# Patient Record
Sex: MALE | Race: WHITE | Employment: OTHER | URBAN - METROPOLITAN AREA
[De-identification: names, ages, dates, MRNs, and addresses within clinical notes are randomized per-mention and may not be internally consistent; named-entity substitution may affect disease eponyms.]

---

## 2019-12-17 ENCOUNTER — TELEPHONE (OUTPATIENT)
Dept: NEUROLOGY | Age: 74
End: 2019-12-17

## 2019-12-17 NOTE — TELEPHONE ENCOUNTER
* Message from [facility] below:      ----- Message from Daryl Meadows sent at 12/13/2019  2:30 PM EST -----  Regarding: Ta Maurer - /Telephone  General Message/Vendor Calls    Caller's first and last name:  Stewart Gregorio     Reason for call: Pt case Coleen Youngdemar called on behalf of the pt to schedule a sooner New Pt appt to diagnose and treat worsening symptoms of \"CPE\". No sooner appt's were avaliable however, Ms. Gladys Acharya was advised to seek appropriate medical care.        Callback required yes/no and why: Yes       Best contact number(s):[phone]       Details to clarify the request: N/A

## 2020-02-17 ENCOUNTER — OFFICE VISIT (OUTPATIENT)
Dept: NEUROLOGY | Age: 75
End: 2020-02-17

## 2020-02-17 VITALS
SYSTOLIC BLOOD PRESSURE: 138 MMHG | WEIGHT: 189 LBS | BODY MASS INDEX: 23.5 KG/M2 | HEIGHT: 75 IN | HEART RATE: 71 BPM | DIASTOLIC BLOOD PRESSURE: 74 MMHG | OXYGEN SATURATION: 98 %

## 2020-02-17 DIAGNOSIS — G31.84 MCI (MILD COGNITIVE IMPAIRMENT): Primary | ICD-10-CM

## 2020-02-17 RX ORDER — DESVENLAFAXINE 100 MG/1
TABLET, EXTENDED RELEASE ORAL DAILY
COMMUNITY

## 2020-02-17 NOTE — PROGRESS NOTES
Neurology Note    Patient ID:  Juan Stinson  <[id number]>  78 y.o.  1945      Date of Consultation:  February 17, 2020    Referring Physician: Dr. Laina Chacko  Reason for Consultation:  Cognitive concerns    Subjective: do I have CTE? History of Present Illness:   Juan Santiago is a 69 y.o. male was referred to the neurology clinic at [facility] for an evaluation. The patient is concerned about his memory. He feels that he is more forgetful. He states he has immediate recall but he has difficulty with short term memory. He is also having difficulties with his honeydo list.  He is also more forgetful when he is multitasking and has trouble concentrating. He reports that he is always a slight bit absent-minded but it is more noticeable. He does present today with his wife who provides additional information. He feel that most of his symptoms began approximately 2 years ago. He was competing in an athletic event at that time. It was immediately afterwards that he just seemed to have a different personality. He was actually admitted to [facility] for an anxiety attack. There was multiple medications that were started for anxiety and depression and ultimately Pristiq seems to be helping with that. He has become more and more concerned about his memory and his forgetfulness. He does question how much this may be related to his sleep. He does wake up anywhere between 3-5 times at night due to having to urinate and he does wonder if this sleep deprivation is at all contributing to his cognitive limitations. He still does work full-time and has had no concerns in regards to his work performance. 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 He does state that he knows that he has had a concussion.   He is unsure if he is ever lost consciousness but he does feel that he has had his bell rung multiple times but never to the point where he needed to come out of the game. He has went and establish care with a primary care doctor. Dr. Chalo Cornejo [facility]   He states he did have some serology performed and initially was told that he was slightly anemic. They do know that his thyroid and his Lyme testing. He is unsure what other lab tests were ordered. We do not have a copy of those labs available for my review today. Past Medical History:   Diagnosis Date    Anxiety     Cerebral artery occlusion with cerebral infarction [facility])         Past Surgical History:   Procedure Laterality Date    HX CERVICAL FUSION          History reviewed. No pertinent family history. Social History     Tobacco Use    Smoking status: Never Smoker    Smokeless tobacco: Never Used   Substance Use Topics    Alcohol use: Yes     Alcohol/week: 1.0 standard drinks     Types: 1 Glasses of wine per week        No Known Allergies     Prior to Admission medications    Medication Sig Start Date End Date Taking? Authorizing Provider   Desvenlafaxine (PRISTIQ) 100 mg Tb24 Take  by mouth.    Yes Provider, Historical       Review of Systems:    General, constitutional: negative  Eyes, vision: negative  Ears, nose, throat: negative  Cardiovascular, heart: negative  Respiratory: negative  Gastrointestinal: negative  Genitourinary: negative  Musculoskeletal: negative  Skin and integumentary: negative  Psychiatric: negative  Endocrine: negative  Neurological: negative, except for HPI  Hematologic/lymphatic: negative  Allergy/immunology: negative      Objective:     Visit Vitals  /74   Pulse 71   Ht 6' 3\" (1.905 m)   Wt 189 lb (85.7 kg)   SpO2 98%   BMI 23.62 kg/m²       Physical Exam:      General:  appears well nourished in no acute distress  Neck: no carotid bruits  Lungs: clear to auscultation  Heart:  no murmurs, regular rate  Lower extremity: peripheral pulses palpable and no edema  Skin: intact    Neurological exam:    Awake, alert, oriented to person, place and time  Recent and remote memory were normal  Attention and concentration were intact  Language was intact. There was no aphasia  Speech: no dysarthria  Fund of knowledge was preserved  He did name 17 words that start with the P in 1 minute today. I cannot appreciate a cognitive limitation but he has a very high IQ and does quite well on bedside testing. Cranial nerves:   II-XII were tested    Perrrla  Fundoscopic examination revealed venous pulsations and no clear abnormalities  Visual fields were full  Eomi, no evidence of nystagmus  Facial sensation:  normal and symmetric  Facial motor: normal and symmetric  Hearing intact  SCM strength intact  Tongue: midline without fasciculations    Motor: Tone normal    No evidence of fasciculations    Strength testing:   deltoid triceps biceps Wrist ext. Wrist flex. intrinsics Hip flex. Hip ext. Knee ext. Knee flex Dorsi flex Plantar flex   Right 5 5 5 5 5 5 5 5 5 5 5 5   Left 5 5 5 5 5 5 5 5 5 5 5 5         Sensory:  Upper extremity: intact to pp, light touch, and vibration > 10 seconds  Lower extremity: intact to pp, light touch. Vibration was 5 seconds at his toes and 8 seconds at his ankles. Reflexes:    Right Left  Biceps  2 2  Triceps 2 2  Brachiorad. 2 2  Patella  2 2  Achilles 1 1    Plantar response:  flexor bilaterally    Incidental finding of a palmamental on the left. Cerebellar testing:  no tremor apparent, finger/nose and di were intact    Romberg: absent    Gait: steady. tandem gait were normal    I do not have any imaging or labs for my review. Assessment and Plan:   Patient is a pleasant 82-year-old gentleman who presents with concerns of memory loss. His neurological examination was intact except for an incidental finding of a palmomental reflex.     Cognitive impairment:    The differential for this does include early onset dementia versus chronic traumatic encephalopathy versus space-occupying lesion versus metabolic disturbance including sleep deprivation versus a pseudodementia associated with his anxiety and depression. I do think he would benefit from an MRI of his brain with and without contrast.    I would also recommend that he receive neuropsychological testing. I did place a referral with Dr. Sanford Beauchamp. I will have him sign a release of information so I can see what metabolic testing has been completed and if additional labs are needed we will surely get those sent off. I would recommend that he work closely with his primary care doctor in regards to his frequent urination at night to see if we can improve his sleep hygiene. The patient should return to clinic after above testing    Renewed medication:none     I spent considerable time today discussing with the patient and his wife the differential diagnosis. There may be additional testing that we need to perform after this first testing. Depending what we do find, we will develop an appropriate treatment plan. All of their questions were answered fully today.     Signed By:  Taniya Bee DO FAAN    February 17, 2020

## 2020-02-18 ENCOUNTER — DOCUMENTATION ONLY (OUTPATIENT)
Dept: NEUROLOGY | Age: 75
End: 2020-02-18

## 2020-03-09 ENCOUNTER — HOSPITAL ENCOUNTER (OUTPATIENT)
Dept: MRI IMAGING | Age: 75
Discharge: HOME | End: 2020-03-09
Attending: PSYCHIATRY & NEUROLOGY
Payer: MEDICARE

## 2020-03-09 DIAGNOSIS — G31.84 MCI (MILD COGNITIVE IMPAIRMENT): ICD-10-CM

## 2020-03-09 PROCEDURE — A9575 INJ GADOTERATE MEGLUMI 0.1ML: HCPCS | Performed by: PSYCHIATRY & NEUROLOGY

## 2020-03-09 PROCEDURE — 74011250636 HC RX REV CODE- 250/636: Performed by: PSYCHIATRY & NEUROLOGY

## 2020-03-09 PROCEDURE — 70553 MRI BRAIN STEM W/O & W/DYE: CPT

## 2020-03-09 RX ORDER — GADOTERATE MEGLUMINE 376.9 MG/ML
17 INJECTION INTRAVENOUS
Status: COMPLETED | OUTPATIENT
Start: 2020-03-09 | End: 2020-03-09

## 2020-03-09 RX ADMIN — GADOTERATE MEGLUMINE 17 ML: 376.9 INJECTION INTRAVENOUS at 21:05

## 2020-03-11 NOTE — PROGRESS NOTES
Hi,    I did review your MRI. There was a mild amount of atrophy. Interestingly, he did have 3 silent strokes in the past.  After you complete your cognitive testing with Dr. Sandra Arias, I will have you come back to clinic so we can fully discuss the MRI and the results of the additional testing.     There is nothing acutely that we need to do    Sincerely, Dr. Severo Callas

## 2020-03-17 ENCOUNTER — OFFICE VISIT (OUTPATIENT)
Dept: NEUROLOGY | Age: 75
End: 2020-03-17

## 2020-03-17 DIAGNOSIS — G31.84 MILD COGNITIVE IMPAIRMENT WITH MEMORY LOSS: Primary | ICD-10-CM

## 2020-03-17 DIAGNOSIS — R41.89 COGNITIVE DECLINE: Primary | ICD-10-CM

## 2020-03-17 NOTE — PROGRESS NOTES
This note will not be viewable in [address] [facility] at [address]    Office:  [phone]  Fax: [phone]                 Initial Office Exam    Patient Name: Shahid Hull. Age: 69 y.o. Gender: male   Occupation: Dentist  Handedness: left handed   Presenting Concern: Short-term memory  Primary Care Physician: Latrice Barton MD  Referring Provider: Genesis Lovelace MD    REASON FOR REFERRAL:  This comprehensive and medically necessary neuropsychological assessment was requested to assist with a differential diagnosis of MCI. The use and purpose of this examination, as well as the extent and limitations of confidentiality, were explained prior to obtaining permission to participate. Instructions were provided regarding the necessity to put forth optimal effort and answer questions truthfully in order to obtain reliable and accurate test results. PERTINENT HISTORY:  Mr. Mekhi Lopez presented for a neuropsychological assessment at the recommendation of his treating physician secondary to complaints of memory problems, impaired concentration, less verbal, feeling slowed down, more moodiness, and more impulsive. Mr. Mekhi Lopez began noticing symptoms about 2-3 years ago. He also reports a progressive worsening of anxiety. Reports participating in football and rugby in his youth, but stated that he only sustained minor concussions. From a brief review of his medical and personal history there has not been any other significant neurological injury or illness noted or reported. He did not report experiencing depression or anxiety in the past.      Mr. Mekhi Lopez does not  report any problems at birth or difficulties meeting developmental milestones.  He reports that he had an adequate level of family support and was not subject to trauma or abuse as a child. Mr. Yang Contreras does not  report being retain in school or receiving special assistance in any of he classes or subjects. Mr. Yang Contreras completed 20 years of education. Mr. Yang Contreras does  exercise on a regular basis and does  maintain a balanced diet. He does  report problems with sleep and does  complain of pain. He does  participate in mentally stimulating activities. Mr. Yang Contreras does not  have concerns regarding prescription medications, family members, place of residence, or financial stressors. Mr. Yang Contreras indicated that he is independent in his instrumental activities of daily living, including shopping, meal preparation, housekeeping, doing laundry, driving a car, managing medications, and finances. Current Outpatient Medications   Medication Sig    Desvenlafaxine (PRISTIQ) 100 mg Tb24 Take  by mouth. No current facility-administered medications for this visit. Past Medical History:   Diagnosis Date    Anxiety     Cerebral artery occlusion with cerebral infarction ([facility] [address]        No flowsheet data found. No data recorded    Past Surgical History:   Procedure Laterality Date    HX CERVICAL FUSION         Social History     Socioeconomic History    Marital status:      Spouse name: Not on file    Number of children: Not on file    Years of education: Not on file    Highest education level: Not on file   Tobacco Use    Smoking status: Never Smoker    Smokeless tobacco: Never Used   Substance and Sexual Activity    Alcohol use: Yes     Alcohol/week: 1.0 standard drinks     Types: 1 Glasses of wine per week       No family history on file. CT Results (most recent):  No results found for this or any previous visit.   MRI Results (most recent):  Results from [place] encounter on 03/09/20   MRI BRAIN W WO CONT    Narrative EXAM:  MRI BRAIN W WO CONT    INDICATION:    cte vs dementia    COMPARISON: None.    CONTRAST: 17 ml Dotarem. TECHNIQUE:    Multiplanar multisequence acquisition without and with contrast of the brain. FINDINGS:  Mild generalized parenchymal volume loss with commensurate dilation of the sulci  and ventricular system. Small chronic infarcts are noted in the right corona  radiata/basal ganglia, right medial thalamus, and left cerebellum. No chronic  microhemorrhages are identified. There is no acute infarct, hemorrhage,  extra-axial fluid collection, or mass effect. There is no cerebellar tonsillar  herniation. Expected arterial flow-voids are present. No evidence of abnormal  enhancement. Retention cyst in the left maxillary sinus, and scattered mucosal thickening in  the bilateral ethmoidal air cells without air-fluid level. The mastoid air cells  and middle ears are clear. The orbital contents are within normal limits. No  significant osseous or scalp lesions are identified. Impression IMPRESSION:     1. No acute intracranial abnormality. 2. Mild generalized parenchymal volume loss. Small chronic infarcts in the right  corona radiata/basal ganglia, right thalamus, and left cerebellum. MENTAL STATUS:    Orientation: Fully oriented   Eye Contact: appropriate   Motor Behavior:  Ambulates independently   Speech:  Fluent and intelligible   Thought Process:  Goal-directed   Thought Content: No evidence of hallucinations or delusions   Suicidal ideations: denies   Mood:  euthymic   Affect:  Congruent with stated mood   Concentration:  WNL   Abstraction:  Mildly reduced   Insight:  WNL     On the Modified Mini-Mental Status Exam: 92/100 (WNL)      DIAGNOSTIC IMPRESSIONS:    ICD-10-CM ICD-9-CM    1. Cognitive decline R41.89 294.9              PLAN:  1. Complete a comprehensive neuropsychological assessment to provide a differential diagnosis of presenting concerns as well as to assist with disposition and treatment planning as appropriate.   2. Consider compensatory and remedial cognitive training. 81012 x 1 Review of records. Face to face interview w/ patient. Determine test protocol: 60 minutes. Total 1 unit  56401 continuation of service      Kortney Leija, PhD, ABPP, LCP  Licensed Clinical Psychologist/ Neuropsychologist        This note will not be viewable in [address]

## 2020-03-17 NOTE — LETTER
3/26/20 Patient: Rachel Morrow. YOB: 1945 Date of Visit: 3/17/2020 Kaitlyn Chapman MD 
[address] [facility] [address] VIA Facsimile: [phone] Annabel Hewitt DO 
[address] [place] [address] Drive [address] [address] VIA In Basket Dear MD Annabel Roth DO, Thank you for referring Mr. Natalie Le to [address]  for evaluation. My notes for this consultation are attached. This note will not be viewable in [address] [facility] at [address] Medical Office [address] Office:  [phone]  Fax: [phone] Neuropsychological Evaluation Report Patient Name: Rachel Morrow. Age: 69 y.o. Gender: male Occupation: Dentist 
Handedness:  Left-handed Presenting Concern: Short-term memory Primary Care Physician: Bonnie Barry MD 
Referring Provider: Holly Navarrete MD 
  
 
PATIENT HISTORY (OBTAINED DURING INITIAL CLINICAL EVALUATION): 
 
REASON FOR REFERRAL: 
This comprehensive and medically necessary neuropsychological assessment was requested to assist with a differential diagnosis of MCI. The use and purpose of this examination, as well as the extent and limitations of confidentiality, were explained prior to obtaining permission to participate. Instructions were provided regarding the necessity to put forth optimal effort and answer questions truthfully in order to obtain reliable and accurate test results. 
  
PERTINENT HISTORY: 
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    Dr. Robert Bejarano began noticing symptoms about 2-3 years ago. He also reports a progressive worsening of anxiety. Reports participating in football and rugby in his youth, but stated that he only sustained minor concussions. Recent imaging of the brain indicates mild generalized parenchymal volume loss with commensurate dilation of the sulci and ventricular system. 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 From a brief review of his medical and personal history there has not been any other significant neurological injury or illness noted or reported. He did  report experiencing depression and anxiety in the past 2-3 years.   
  
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 He reports that he had an adequate level of family support and was not subject to trauma or abuse as a child. Dr. Shantel Spain does not  report being retain in school or receiving special assistance in any of he classes or subjects. Dr. Shantel Spain completed 20 years of education. 
  
Dr. Shantel Spain does  exercise on a regular basis and does  maintain a balanced diet. He does  report problems with sleep and does  complain of pain. He does  participate in mentally stimulating activities. Dr. Shantel Spain does not  have concerns regarding prescription medications, family members, place of residence, or financial stressors. Dr. Shantel Spain indicated that he is independent in his instrumental activities of daily living, including shopping, meal preparation, housekeeping, doing laundry, driving a car, managing medications, and finances. 
  
 
METHODS OF ASSESSMENT (Current Evaluation): 
Clinician Administered: 
Clinical Interview Review of Medical Records Clock Drawing Task Modified Mini-Mental Status Exam (3MS) Test of Premorbid Functioning Incidental Memory Test 
Revised Memory and Behavior Checklist 
 
Technician Administered: DKEFS: Design Fluency Test, [place] Test 
Neuropsychological Assessment Battery Test of Memory Malingering Test of Practical Judgment (9-Item) [place] Making Test 
[employer] Test 
Word Choice Effort Test (ACS) TEST OBSERVATIONS: 
Dr. Annmarie Cuba arrived promptly for the testing session. Dress and grooming were appropriate; physical presentation was unchanged from that observed during the clinical interview. Speech was fluent, intelligible, and goal-directed. Affect was congruent with the euthymic mood conveyed. Dr. Annmarie Cuba was adequately cooperative and appeared to put forth good effort throughout this examination. Rapport with the examiner was adequately established and maintained. Minimal prompting was required. Comprehension of test instructions was not problematic. Performance motivation was objectively measured by three instruments (Reliable Digits, TOMM, Word Choice Effort), and Dr. Annmarie Cuba produced a normal score on these measures. Accordingly, test findings below do not appear to be the product of disingenuous effort. Given the above observations, plus comments contained in the Mental Status section, the results of this examination are regarded as reasonably reliable and valid. TEST RESULTS: 
Quantitative test results are derived from comparisons to age and education corrected cohort normative data, where applicable. Percentiles are included in these instances. Qualitative test results are determined using clinical observations. General Orientation and Awareness:      
Orientation to person, year, month, day of month, day of week, state, town, and circumstance. Awareness of deficits: Aware Cognitive performance validity testing: Valid Attention/Concentration:      Classification:    
Simple visuomotor tracking (18 percentile)               Low Average Digits forward (90 percentile)                 High Average Digits backward (50 percentile)                 Average Visual scanning (58 percentile)                            Average Simple information processing  efficiency (79 percentile)  High Average Complex information processing efficiency (4 percentile)  Moderately Impaired Attention to visual detail of driving scenes (8 percentile)                         Mildly Impaired Visuospatial and Constructional Praxis:    
Visual discrimination (50 percentile)                            Average Design construction (62 percentile)                 Average Figure drawing copy (93 percentile)                                                  Superior Language:        
Expressive speech in oral production (62 percentile)       Average Auditory comprehension (<1 percentile)                   Severely Impaired Naming (73 percentile)          Average Reading comprehension (50 percentile)        Average Writing (24 percentile)                     Low Average Bill payment task (66 percentile)                    Average Memory and Learning:      
Word list immediate recall (12 percentile)                Low Average Word list short delayed recall (<1 percentile)                Severely Impaired Word list long delayed recall (<1 percentile)                           Severely Impaired Shape learning immediate recognition (69 percentile)    Average Shape learning delayed recognition (34 percentile)               Average Story learning immediate recall (3 percentile)                Moderately Impaired Story learning delayed recall (5 percentile)                           Mildly Impaired Daily living memory immediate recall (8 percentile)        Mildly Impaired Daily living memory delayed recall (14 percentile)               Low Average Cognitive Tests of Executive Functioning:    
Ability to think flexibly, Trail Making B (6 percentile)               Mildly Impaired Design fluency total correct (50 percentile)     Average [place]( 26 percentile)          Average [place]es (58 percentile)                   Average Simple judgment in daily decision making (16 percentile)             Low Average Complex judgment in daily decision making (66 percentile)              Average Categories (7 percentile)                             Mildly Impaired Word generation (62 percentile)                  Average WCST Total Errors (18 percentile)                 Low Average Perseverative Responses (39 percentile)    Average Categories Completed (>16 percentile) Intellectual and Basic Cognitive Functioning (WAIS-IV): 
ACS Test of pre-morbid functioning reading recognition lower limit estimated WAIS-IV FSIQ score: 
     Estimated full scale IQ:           119    89 percentile     High Average IMPRESSIONS: 
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 Overall his functioning across the 5 neurocognitive domains fell in the low average range. Performance on individual domains ranged from severely impaired to average. On measures of attention and concentration his performance was in the average range, with his complex information processing efficiency being moderately impaired range and his attention to visual detail being mildly impaired. His performance on other measures within this domain ranged from low average to high average. Mr. Tania Rivera performance on measures within the spatial domain was within the average range, with no indication of impairment. Similarly, his performance on measures of the Language domain were also in the average to low average range, with the exception of the auditory comprehension which was severely impaired.  This finding is likely secondary to auditory inattentiveness, rather than comprehension, although this was not seen on measures of attention above. Dr. Christoph Tomas performance on measures within the domains of memory and executive functioning were the most impaired and in the moderate and severe range; respectively. Within the memory domain, verbal memory was severely impaired and visual memory was within the average range. These findings are not consistent with MRI studies as reported in the medical record, which indicate greater right hemispheric dysfunction. On measures of executive functioning, his overall level of functioning was in the low average range, areas of difficulty were noted on measures of cognitive flexibility and categorical reasoning. 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 There is nothing uniquely differentiating his clinical profile that is symptomatic of CTE. DIAGNOSTIC IMPRESSIONS: 
  ICD-10-CM ICD-9-CM 1. Mild cognitive impairment with memory loss G31.84 331.83 [place] NEUROPSYCHOLOGICAL TST EVAL PHYS/QHP 1ST HOUR  
  780.93 [place] NEUROPSYCHOLOGICAL TST EVAL PHYS/QHP EA ADDL HR  
   [place] PSYL/NRPSYCL TST PHYS/QHP 2+ TST 1ST 30 MIN  
   [place] PSYCL/NRPSYCL TST PHYS/QHP 2+ TST EA ADDL 30 MIN  
   [place] PSYCL/NRPSYCL TST TECH 2+ TST 1ST 30 MIN  
   [place] PSYCL/NRPSYCL TST TECH 2+ TST EA ADDL 30 MIN  
 
 
 
RECOMMENDATIONS:  
1. Findings should be reviewed with Dr. Waylon Knig to insure his understanding and discuss the potential implications. 2. Emphasis should be placed on Dr. Waylon King obtaining good sleep hygiene and maintaining adequate physical exercise to promote good brain health. 3. Mr. Jovani Carson may benefit from a referral to psychotherapy to address anxiety and work on adequate stress management skills. 4. Mr. Jovani Carson may benefit from a referral to Speech and Language Pathology for cognitive rehabilitation. 5. Mr. Jovani Carson should consider a driving evaluation to insure safe driving ability. This can be obtained through [address] [place] [address] or an assortment of other driving programs ([email or web address] ). Thank you for allowing me the opportunity to assist you in Dr. Jared sargent. Please do not hesitate to contact me should you have additional questions that I may not have addressed. 96136 x 1 
96138 x 1 
96139 x 6 
96132 x 1 
96133 x 2 Analy Keith, Ph.D., ABPP Licensed Clinical Psychologist 
Neuropsychologist 
Board Certified Rehabilitation Psychologist 
 
 
Time Documentation: 
  
51787 x 1: Neurobehavioral Status Exam/Clinical Interview: (1 hour, (already billed on first date of service) 76401*3 Neuropsych testing/data gathering by Neuropsychologist (35 additional minutes, see above) 45874 x 1 
96139 x 6 Test Administration/Data Gathering By Technician: (3.5 hours). 80952 x 1 (first 30 minutes), 96139 x 7 (each additional 30 minutes) 14210 x 1 
96133 x 1 Testing Evaluation Services by Neuropsychologist (1 hour, 50 minutes) 96132 x 1 (first hour), 96133 x 1 (50 minutes) Definitions:   
  
20629/16735:  Neurobehavioral Status Exam, Clinical interview. Clinical assessment of thinking, reasoning and judgment, by neuropsychologist, both face to face time with patient and time interpreting those test results and reporting, first and subsequent hours) 37678/10693: Neuropsychological Test Administration by Technician/Psychometrist, first 30 minutes and each additional 30 minutes. The above includes: Record review. Review of history provided by patient. Review of collaborative information. Testing by Clinician. Review of raw data. Scoring. Report writing of individual tests administered by Clinician. Integration of individual tests administered by psychometrist with NSE/testing by clinician, review of records/history/collaborative information, case Conceptualization, treatment planning, clinical decision making, report writing, coordination Of Care. Psychometry test codes as time spent by psychometrist administering and scoring neurocognitive/psychological tests under supervision of neuropsychologist.  Integral services including scoring of raw data, data interpretation, case conceptualization, report writing etcetera were initiated after the patient finished testing/raw data collected and was completed on the date the report was signed. This note will not be viewable in [address] If you have questions, please do not hesitate to call me. I look forward to following your patient along with you.  
 
 
Sincerely, 
 
Brain Myers, PHD

## 2020-03-24 NOTE — PROGRESS NOTES
This note will not be viewable in [address] [facility] at [address]    Office:  [phone]  Fax: [phone]                                        Neuropsychological Evaluation Report    Patient Name: Nina Kayser. Age: 69 y.o. Gender: male   Occupation: Dentist  Handedness: Left-handed   Presenting Concern: Short-term memory  Primary Care Physician: Ramon Gutiérrez MD  Referring Provider: Drake Sandoval MD       PATIENT HISTORY (OBTAINED DURING INITIAL CLINICAL EVALUATION):    REASON FOR REFERRAL:  This comprehensive and medically necessary neuropsychological assessment was requested to assist with a differential diagnosis of MCI. The use and purpose of this examination, as well as the extent and limitations of confidentiality, were explained prior to obtaining permission to participate. Instructions were provided regarding the necessity to put forth optimal effort and answer questions truthfully in order to obtain reliable and accurate test results.     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 Dr. Jami Mix began noticing symptoms about 2-3 years ago. He also reports a progressive worsening of anxiety. Reports participating in football and rugby in his youth, but stated that he only sustained minor concussions. Recent imaging of the brain indicates mild generalized parenchymal volume loss with commensurate dilation of the sulci and ventricular system. 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  From a brief review of his medical and personal history there has not been any other significant neurological injury or illness noted or reported. He did  report experiencing depression and anxiety in the past 2-3 years.       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 He reports that he had an adequate level of family support and was not subject to trauma or abuse as a child. Dr. King Berg does not  report being retain in school or receiving special assistance in any of he classes or subjects. Dr. King Berg completed 20 years of education.     Dr. King Berg does  exercise on a regular basis and does  maintain a balanced diet. He does  report problems with sleep and does  complain of pain. He does  participate in mentally stimulating activities. Dr. King Berg does not  have concerns regarding prescription medications, family members, place of residence, or financial stressors. Dr. King Berg indicated that he is independent in his instrumental activities of daily living, including shopping, meal preparation, housekeeping, doing laundry, driving a car, managing medications, and finances.       METHODS OF ASSESSMENT (Current Evaluation):  Clinician Administered:  Clinical Interview  Review of Medical Records  Clock Drawing Task  Modified Mini-Mental Status Exam (3MS)  Test of Premorbid Functioning  Incidental Memory Test  Revised Memory and Behavior Checklist    Technician Administered:  DKEFS: Design Fluency Test, Anna Test  Neuropsychological Assessment Battery  Test of Memory Malingering  Test of Practical Judgment (9-Item)  [place] Making Test  NuORDER Choice Effort Test (ACS)    TEST OBSERVATIONS:  Dr. King Berg arrived promptly for the testing session. Dress and grooming were appropriate; physical presentation was unchanged from that observed during the clinical interview. Speech was fluent, intelligible, and goal-directed. Affect was congruent with the euthymic mood conveyed.  Dr. King Berg was adequately cooperative and appeared to put forth good effort throughout this examination. Rapport with the examiner was adequately established and maintained. Minimal prompting was required. Comprehension of test instructions was not problematic. Performance motivation was objectively measured by three instruments (Reliable Digits, TOMM, Word Choice Effort), and Dr. Yang Contreras produced a normal score on these measures. Accordingly, test findings below do not appear to be the product of disingenuous effort. Given the above observations, plus comments contained in the Mental Status section, the results of this examination are regarded as reasonably reliable and valid. TEST RESULTS:  Quantitative test results are derived from comparisons to age and education corrected cohort normative data, where applicable. Percentiles are included in these instances. Qualitative test results are determined using clinical observations. General Orientation and Awareness:       Orientation to person, year, month, day of month, day of week, state, town, and circumstance.    Awareness of deficits: Aware                   Cognitive performance validity testing: Valid        Attention/Concentration:      Classification:     Simple visuomotor tracking (18 percentile)               Low Average  Digits forward (90 percentile)                 High Average  Digits backward (50 percentile)                 Average  Visual scanning (58 percentile)                            Average  Simple information processing  efficiency (79 percentile)  High Average  Complex information processing efficiency (4 percentile)  Moderately Impaired  Attention to visual detail of driving scenes (8 percentile)                         Mildly Impaired    Visuospatial and Constructional Praxis:     Visual discrimination (50 percentile)                            Average   Design construction (62 percentile)                 Average  Figure drawing copy (93 percentile)                                                  Superior     Language:         Expressive speech in oral production (62 percentile)       Average  Auditory comprehension (<1 percentile)                   Severely Impaired   Naming (73 percentile)          Average  Reading comprehension (50 percentile)        Average   Writing (24 percentile)                     Low Average   Bill payment task (66 percentile)                    Average    Memory and Learning:       Word list immediate recall (12 percentile)                Low Average  Word list short delayed recall (<1 percentile)                Severely Impaired  Word list long delayed recall (<1 percentile)                           Severely Impaired  Shape learning immediate recognition (69 percentile)    Average    Shape learning delayed recognition (34 percentile)               Average  Story learning immediate recall (3 percentile)                Moderately Impaired  Story learning delayed recall (5 percentile)                           Mildly Impaired  Daily living memory immediate recall (8 percentile)        Mildly Impaired  Daily living memory delayed recall (14 percentile)               Low Average    Cognitive Tests of Executive Functioning:     Ability to think flexibly, Trail Making B (6 percentile)               Mildly Impaired  Design fluency total correct (50 percentile)     Average  [place]( 26 percentile)          Average   [place] (58 percentile)                   Average  Simple judgment in daily decision making (16 percentile)             Low Average  Complex judgment in daily decision making (66 percentile)              Average  Categories (7 percentile)                             Mildly Impaired  Word generation (62 percentile)                  Average  WCST        Total Errors (18 percentile)                 Low Average         Perseverative Responses (39 percentile)    Average         Categories Completed (>16 percentile)      Intellectual and Basic Cognitive Functioning (WAIS-IV):  ACS Test of pre-morbid functioning reading recognition lower limit estimated WAIS-IV FSIQ score:       Estimated full scale IQ:           119    89 percentile     High Average    IMPRESSIONS:  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 Overall his functioning across the 5 neurocognitive domains fell in the low average range. Performance on individual domains ranged from severely impaired to average. On measures of attention and concentration his performance was in the average range, with his complex information processing efficiency being moderately impaired range and his attention to visual detail being mildly impaired. His performance on other measures within this domain ranged from low average to high average. Mr. Lisbeth Adkins performance on measures within the spatial domain was within the average range, with no indication of impairment. Similarly, his performance on measures of the Language domain were also in the average to low average range, with the exception of the auditory comprehension which was severely impaired. This finding is likely secondary to auditory inattentiveness, rather than comprehension, although this was not seen on measures of attention above. Dr. Lisbeth Adkins performance on measures within the domains of memory and executive functioning were the most impaired and in the moderate and severe range; respectively. Within the memory domain, verbal memory was severely impaired and visual memory was within the average range. These findings are not consistent with MRI studies as reported in the medical record, which indicate greater right hemispheric dysfunction.  On measures of executive functioning, his overall level of functioning was in the low average range, areas of difficulty were noted on measures of cognitive flexibility and categorical reasoning. 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 There is nothing uniquely differentiating his clinical profile that is symptomatic of CTE. DIAGNOSTIC IMPRESSIONS:    ICD-10-CM ICD-9-CM    1. Mild cognitive impairment with memory loss G31.84 331.83 [place] NEUROPSYCHOLOGICAL TST EVAL PHYS/QHP 1ST HOUR     780.93 [place] NEUROPSYCHOLOGICAL TST EVAL PHYS/QHP EA ADDL HR      NV PSYL/NRPSYCL TST PHYS/QHP 2+ TST 1ST 30 MIN      NV PSYCL/NRPSYCL TST PHYS/QHP 2+ TST EA ADDL 30 MIN      NV PSYCL/NRPSYCL TST TECH 2+ TST 1ST 30 MIN      NV PSYCL/NRPSYCL TST TECH 2+ TST EA ADDL 30 MIN      NV PSYCL/NRPSYCL TST TECH 2+ TST EA ADDL 30 MIN      NV PSYCL/NRPSYCL TST TECH 2+ TST EA ADDL 30 MIN      NV PSYCL/NRPSYCL TST TECH 2+ TST EA ADDL 30 MIN      NV PSYCL/NRPSYCL TST TECH 2+ TST EA ADDL 30 MIN         RECOMMENDATIONS:   1. 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 2. Emphasis should be placed on  Mervin Bonds obtaining good sleep hygiene and maintaining adequate physical exercise to promote good brain health. 3. Mr. Mervin Law may benefit from a referral to psychotherapy to address anxiety and work on adequate stress management skills. 4. Mr. Mervin Law may benefit from a referral to Speech and Language Pathology for cognitive rehabilitation. 5. Mr. Mervin Law should consider a driving evaluation to insure safe driving ability. This can be obtained through [address] [place] [address] [employer] or an assortment of other driving programs ([email or web address] ). Thank you for allowing me the opportunity to assist you in Dr. Clau sargent. Please do not hesitate to contact me should you have additional questions that I may not have addressed.     07882 x 1  97828 x 1  96139 x 6  96132 x 1  10988 x 2          Josue Og, Ph.D., ABPP  Licensed Clinical Psychologist  Neuropsychologist  Board Certified Rehabilitation Psychologist      Time Documentation:     58654 x 1: Neurobehavioral Status Exam/Clinical Interview: (1 hour, (already billed on first date of service)     96085*9 Neuropsych testing/data gathering by Neuropsychologist (35 additional minutes, see above)      96138 x 1  96139 x 6 Test Administration/Data Gathering By Technician: (3.5 hours). 41020 x 1 (first 30 minutes), 57918 x 7 (each additional 30 minutes)     96132 x 1  96133 x 1 Testing Evaluation Services by Neuropsychologist (1 hour, 50 minutes) 96132 x 1 (first hour), 96133 x 1 (50 minutes)     Definitions:       81699/36102:  Neurobehavioral Status Exam, Clinical interview. Clinical assessment of thinking, reasoning and judgment, by neuropsychologist, both face to face time with patient and time interpreting those test results and reporting, first and subsequent hours)     32170/07610: Neuropsychological Test Administration by Technician/Psychometrist, first 30 minutes and each additional 30 minutes. The above includes: Record review. Review of history provided by patient. Review of collaborative information. Testing by Clinician. Review of raw data. Scoring. Report writing of individual tests administered by Clinician. Integration of individual tests administered by psychometrist with NSE/testing by clinician, review of records/history/collaborative information, case Conceptualization, treatment planning, clinical decision making, report writing, coordination Of Care.  Psychometry test codes as time spent by psychometrist administering and scoring neurocognitive/psychological tests under supervision of neuropsychologist.  Integral services including scoring of raw data, data interpretation, case conceptualization, report writing etcetera were initiated after the patient finished testing/raw data collected and was completed on the date the report was signed. This note will not be viewable in [address]

## 2020-04-13 NOTE — PROGRESS NOTES
Neurology Note    Patient ID:  Campos Gardner  [id number]  69 y.o.  1945      Date of Consultation:  April 14, 2020    Referring Physician: Dr. Mirna Garsia    Reason for Consultation:  Memory concerns    This is a telemedicine visit that was performed with in the originating site at patient's home and the distance site at [facility] outpatient clinic at [facility].  This telemedicine visit utilized synchronous (real-time) audio-video technology. Verbal consent to participate in the video visit was obtained. This visit occurred during the corona (COVID -19) public health emergency. I discussed with the patient the nature of our telemedicine visit, that:  - I would evaluate the patient and recommend diagnostic and treatment based on my assessment  - Our sessions are not being recorded and that personal health information is protected  - Our team will provide follow-up care in person if and when the patient needs it. Consent:  The patient is aware that this patient-initiated Telehealth encounter is a billable service, with coverage as determined by the patient's insurance carrier. The patient is aware that they may receive a bill and has provided verbal consent to proceed:     Subjective: I still have problems with my memory       History of Present Illness:   Campos Gardner. is a 69 y.o. male who returns to the neurology clinic at [facility] for an evaluation. I initially met the patient in February 2020. Please see my history of present illness, examination, and treatment based plan from the visit on February 17, 2020. After that visit he did have an MRI of his brain that was completed. It did reveal a mild degree of atrophy and 3 small silent strokes that he has had in the past.  He also did have a neuropsychological evaluation performed by Dr. Amy Pastrana. Ultimately the testing revealed that there was cognitive impairment with memory loss.   It was recommended that the patient work on improving his sleep and his exercise program.  It was recommended that he work with either his primary care doctor or psychotherapist to address anxiety and stress. It was also recommended that he have a speech and language pathology consult for cognitive rehabilitation. Since that time, the patient states he still does have a difficulties with his short-term memory. He has difficulty with registration and immediate recall. He still is struggling with frequent nocturia. He has a follow-up appointment with his urologist scheduled for 2 weeks. When asking him about was there are times where he had a prior stroke, he talks about a time when he was 17. I am unclear as to the extent of that. His wife did notice that approximately 9 months ago he did have an episode of left facial droop that did resolve. The patient denies any new numbness, tingling, or weakness. Past Medical History:   Diagnosis Date    Anxiety     Cerebral artery occlusion with cerebral infarction [facility])         Past Surgical History:   Procedure Laterality Date    HX CERVICAL FUSION          History reviewed. No pertinent family history. Social History     Tobacco Use    Smoking status: Never Smoker    Smokeless tobacco: Never Used   Substance Use Topics    Alcohol use: Yes     Alcohol/week: 1.0 standard drinks     Types: 1 Glasses of wine per week        No Known Allergies     Prior to Admission medications    Medication Sig Start Date End Date Taking? Authorizing Provider   Desvenlafaxine (PRISTIQ) 100 mg Tb24 Take  by mouth.    Yes Provider, Historical       Review of Systems:    General, constitutional: negative  Eyes, vision: negative  Ears, nose, throat: negative  Cardiovascular, heart: negative  Respiratory: negative  Gastrointestinal: negative  Genitourinary: negative  Musculoskeletal: negative  Skin and integumentary: negative  Psychiatric: negative  Endocrine: negative  Neurological: negative, except for HPI  Hematologic/lymphatic: negative  Allergy/immunology: negative      Objective:     Visit Vitals  Ht 6' 3\" (1.905 m)   Wt 190 lb (86.2 kg)   BMI 23.75 kg/m²       No vital signs were obtained via telemedicine today. There are limitations to the neurological examination due to the technological features of telemedicine  Physical Exam:      General:  appears well nourished in no acute distress  Respiratory:  good respiratory effort. No labored breathing  Skin: intact. No obvious erythematous rashes    Neurological exam:    Awake, alert, oriented to person, place and time  Recent and remote memory were normal  He did have difficulty with immediate recall. He stated that he did not remember specifically all the instructions that I went over with him. Language was intact. There was no aphasia  Speech: no dysarthria  Fund of knowledge was preserved    Cranial nerves:     Visual fields were full  Eomi, no evidence of nystagmus  Facial motor: normal and symmetric  Hearing intact    Tongue: midline without fasciculations    Motor:   No evidence of fasciculations    Strength testing:  Strength was full throughout    Sensory:  Patient denies any sensory disturbance    Reflexes:  Unable to obtain via telemedicine    Cerebellar testing:  no tremor apparent, finger/nose and di were intact    Gait: steady. Labs:     No results found for: HBA1C, NA, K, CL, GLU, BUN, CREA, CA, WBC, HCT, HGB, PLT, LDL, OEL6WQUC, HCTEXT, HGBEXT, PLTEXT, [id number], HCTEXT, HGBEXT, PLTEXT    Imaging:    Results from Hospital Encounter encounter on 03/09/20   MRI BRAIN W WO CONT    Narrative EXAM:  MRI BRAIN W WO CONT    INDICATION:    cte vs dementia    COMPARISON:  None. CONTRAST: 17 ml Dotarem. TECHNIQUE:    Multiplanar multisequence acquisition without and with contrast of the brain.     FINDINGS:  Mild generalized parenchymal volume loss with commensurate dilation of the sulci  and ventricular system. Small chronic infarcts are noted in the right corona  radiata/basal ganglia, right medial thalamus, and left cerebellum. No chronic  microhemorrhages are identified. There is no acute infarct, hemorrhage,  extra-axial fluid collection, or mass effect. There is no cerebellar tonsillar  herniation. Expected arterial flow-voids are present. No evidence of abnormal  enhancement. Retention cyst in the left maxillary sinus, and scattered mucosal thickening in  the bilateral ethmoidal air cells without air-fluid level. The mastoid air cells  and middle ears are clear. The orbital contents are within normal limits. No  significant osseous or scalp lesions are identified. Impression IMPRESSION:     1. No acute intracranial abnormality. 2. Mild generalized parenchymal volume loss. Small chronic infarcts in the right  corona radiata/basal ganglia, right thalamus, and left cerebellum. No results found for this or any previous visit. I did independently review the MRI of his brain performed on March 9, 2020 which reveals mild generalized atrophy and 3 small old chronic infarcts in the right corona radiata, thalamus, and left cerebellum. Assessment and Plan:    Patient is a pleasant 82-year-old gentleman who presents with concerns of memory loss. His recent brain MRI showed mild atrophy and 3 old strokes. His neuropsychological testing revealed cognitive loss which is most likely multifactorial.     1. Cognitive impairment:     This is most likely multifactorial in etiology. I did review with him the results of his MRI of the brain as well as the neuropsychological testing. I am concerned about the beginnings of a dementia. It is more suggestive of a possible vascular dementia. There is no specific evidence suggestive of CTE. I would like him to start an aspirin daily. I reviewed the results with him at great length today.   He needs to begin speech therapy for cognitive training. I placed a consult    He needs to work closely with his primary care doctor in regards to his stress and anxiety as this can impact his cognitive functioning. He will work on scheduling a follow-up    Pending his response, I will repeat his memory testing and 2 months time and additional medications may need to be considered. 2. Prior stroke:    No known risk factors. Needs aspirin daily. Discussed on working closely with his primary care doctor. He will need to keep his systolic blood pressure under 140. He does need an updated diabetes screen  He does need an updated cholesterol panel with goal LDL under 70  I will check a cholesterol panel, B12, TSH today. Once to the coronavirus pandemic clears, I will arrange for him to have a carotid Doppler study. I provided stroke education today in regards to risk factors for stroke and lifestyle modifications to help minimize the risk of future stroke. This included medication compliance, regular follow up with primary care physician,  and healthy lifestyle habits (nutrition/exercise)    3. Nocturia:  He will follow-up with his urologist as this can have a significant impact on his cognitive function    4. Stress and anxiety:  He will continue with the medication previously prescribed to have close follow-up with his primary care doctor     There is no problem list on file for this patient. The patient should return to clinic in 2 months  Renewed medication: none today.  Labs and therapy were ordered        Signed By:  Casey Mota DO FAAN    April 14, 2020

## 2020-04-14 ENCOUNTER — VIRTUAL VISIT (OUTPATIENT)
Dept: NEUROLOGY | Age: 75
End: 2020-04-14

## 2020-04-14 VITALS — HEIGHT: 75 IN | BODY MASS INDEX: 23.62 KG/M2 | WEIGHT: 190 LBS

## 2020-04-14 DIAGNOSIS — R68.89 OTHER GENERAL SYMPTOMS AND SIGNS: ICD-10-CM

## 2020-04-14 DIAGNOSIS — I63.9 CEREBROVASCULAR ACCIDENT (CVA), UNSPECIFIED MECHANISM (HCC): ICD-10-CM

## 2020-04-14 DIAGNOSIS — R90.89 OTHER ABNORMAL FINDINGS ON DIAGNOSTIC IMAGING OF CENTRAL NERVOUS SYSTEM: ICD-10-CM

## 2020-04-14 DIAGNOSIS — R41.89 COGNITIVE DECLINE: Primary | ICD-10-CM

## 2020-05-05 LAB
CHOLEST SERPL-MCNC: 151 MG/DL (ref 100–199)
HDLC SERPL-MCNC: 62 MG/DL
LDLC SERPL CALC-MCNC: 77 MG/DL (ref 0–99)
T4 FREE SERPL-MCNC: 0.87 NG/DL (ref 0.82–1.77)
TRIGL SERPL-MCNC: 60 MG/DL (ref 0–149)
TSH SERPL DL<=0.005 MIU/L-ACNC: 2.96 UIU/ML (ref 0.45–4.5)
VIT B12 SERPL-MCNC: 327 PG/ML (ref 232–1245)
VLDLC SERPL CALC-MCNC: 12 MG/DL (ref 5–40)

## 2020-05-05 NOTE — PROGRESS NOTES
Hello,    Your laboratory results that I have received have come back normal.    Sincerely,    Dr. Michelle Adkins

## 2020-06-01 ENCOUNTER — TELEPHONE (OUTPATIENT)
Dept: NEUROLOGY | Age: 75
End: 2020-06-01

## 2020-06-05 DIAGNOSIS — R41.89 COGNITIVE DECLINE: Primary | ICD-10-CM

## 2020-06-08 ENCOUNTER — DOCUMENTATION ONLY (OUTPATIENT)
Dept: NEUROLOGY | Age: 75
End: 2020-06-08

## 2020-06-08 NOTE — PROGRESS NOTES
[facility]  [facility]

## 2020-06-11 ENCOUNTER — DOCUMENTATION ONLY (OUTPATIENT)
Dept: NEUROLOGY | Age: 75
End: 2020-06-11

## 2020-06-15 NOTE — PROGRESS NOTES
Neurology Note    Patient ID:  Lalitha Rios  [id number]  69 y.o.  1945      Date of Consultation:  June 16, 2020    Referring Physician: Dr. Yon Atkinson    Reason for Consultation:  Memory concerns    Subjective: my memory       History of Present Illness:   Lalitha Dunn is a 69 y.o. male who returns to the neurology clinic at [facility] for evaluation. He was last seen in clinic on April 14, 2020. Please see my history of present illness, examination, and treatment based plan from that day. He was being seen for progressive memory loss and a prior stroke. Since that time, he feels everything was about the same. He did meet with a speech therapist yesterday for the first time. He was introduced to cognitive therapy exercises which will be performed daily. Undergoing stressors with trying to sell a house. Past Medical History:   Diagnosis Date    Anxiety     Cerebral artery occlusion with cerebral infarction ([facility] [address]     Memory disorder         Past Surgical History:   Procedure Laterality Date    HX CERVICAL FUSION          History reviewed. No pertinent family history. Social History     Tobacco Use    Smoking status: Never Smoker    Smokeless tobacco: Never Used   Substance Use Topics    Alcohol use: Yes     Alcohol/week: 1.0 standard drinks     Types: 1 Glasses of wine per week        No Known Allergies     Prior to Admission medications    Medication Sig Start Date End Date Taking? Authorizing Provider   multivitamin (ONE A DAY) tablet Take 1 Tab by mouth daily. Yes Provider, Historical   Desvenlafaxine (PRISTIQ) 100 mg Tb24 Take  by mouth daily.    Yes Provider, Historical       Review of Systems:    General, constitutional: negative  Eyes, vision: negative  Ears, nose, throat: negative  Cardiovascular, heart: negative  Respiratory: negative  Gastrointestinal: negative  Genitourinary: negative  Musculoskeletal: negative  Skin and integumentary: negative  Psychiatric: negative  Endocrine: negative  Neurological: negative, except for HPI  Hematologic/lymphatic: negative  Allergy/immunology: negative      Objective:     Visit Vitals  /80   Pulse 62   Ht 6' 3\" (1.905 m)   Wt 190 lb (86.2 kg)   SpO2 95%   BMI 23.75 kg/m²       Physical Exam:      General:  appears well nourished in no acute distress  Neck: no carotid bruits  Lungs: clear to auscultation  Heart:  no murmurs, regular rate  Lower extremity: peripheral pulses palpable and no edema  Skin: intact    Neurological exam:    Awake, alert, oriented to person, place and time  Recent and remote memory were normal  Attention and concentration were intact  Language was intact. There was no aphasia  Speech: no dysarthria  Fund of knowledge was preserved    Cranial nerves:   II-XII were tested    Perrrla  Visual fields were full  Eomi, no evidence of nystagmus  Facial sensation:  normal and symmetric  Facial motor: normal and symmetric  Hearing intact  SCM strength intact  Tongue: midline without fasciculations    Motor: Tone normal    No evidence of fasciculations    Strength testing:   deltoid triceps biceps Wrist ext. Wrist flex. intrinsics Hip flex. Hip ext. Knee ext. Knee flex Dorsi flex Plantar flex   Right 5 5 5 5 5 5 5 5 5 5 5 5   Left 5 5 5 5 5 5 5 5 5 5 5 5         Sensory:  Upper extremity: intact to pp, light touch, and vibration > 10 seconds  Lower extremity: intact to pp, light touch, and vibration > 10 seconds    Reflexes:    Right Left  Biceps  2 2  Triceps 2 2  Brachiorad. 2 2  Patella  2 2  Achilles 2 2    Plantar response:  flexor bilaterally      Cerebellar testing:  no tremor apparent, finger/nose and di were intact    Romberg: absent    Gait: steady.   Heel, toe, and tandem gait were normal    Labs:     No results found for: HBA1C, NA, K, CL, GLU, BUN, CREA, CA, WBC, HCT, HGB, PLT, LDL, [id number], HCTEXT, HGBEXT, PLTEXT, [id number], HCTEXT, HGBEXT, PLTEXT    Imaging:    Results from Hospital Encounter encounter on 03/09/20   MRI BRAIN W WO CONT    Narrative EXAM:  MRI BRAIN W WO CONT    INDICATION:    cte vs dementia    COMPARISON:  None. CONTRAST: 17 ml Dotarem. TECHNIQUE:    Multiplanar multisequence acquisition without and with contrast of the brain. FINDINGS:  Mild generalized parenchymal volume loss with commensurate dilation of the sulci  and ventricular system. Small chronic infarcts are noted in the right corona  radiata/basal ganglia, right medial thalamus, and left cerebellum. No chronic  microhemorrhages are identified. There is no acute infarct, hemorrhage,  extra-axial fluid collection, or mass effect. There is no cerebellar tonsillar  herniation. Expected arterial flow-voids are present. No evidence of abnormal  enhancement. Retention cyst in the left maxillary sinus, and scattered mucosal thickening in  the bilateral ethmoidal air cells without air-fluid level. The mastoid air cells  and middle ears are clear. The orbital contents are within normal limits. No  significant osseous or scalp lesions are identified. Impression IMPRESSION:     1. No acute intracranial abnormality. 2. Mild generalized parenchymal volume loss. Small chronic infarcts in the right  corona radiata/basal ganglia, right thalamus, and left cerebellum. No results found for this or any previous visit. Assessment and Plan:  Patient is a pleasant 79-year-old gentleman who presents with concerns of memory loss.    His recent brain MRI showed mild atrophy and 3 old strokes. His neuropsychological testing revealed cognitive loss which is most likely multifactorial.     1. Cognitive impairment:     This is most likely multifactorial in etiology. I did review with him the results of his MRI of the brain as well as the neuropsychological testing. I am concerned about the beginnings of a dementia.     It is more suggestive of a possible vascular dementia, however an alzheimer's type may also be present. There is no specific evidence suggestive of CTE. Continue asa daily. Continue speech and language therapy. Start aricept 10 mg a day.     He needs to work closely with his primary care doctor in regards to his stress and anxiety as this can impact his cognitive functioning. He will work on scheduling a follow-up     I discussed with the patient and family members in regards to the patient's cognitive limitations and need to ensure patient safety. Attempts to minimize opportunities of harm is important. Activities to be monitored, or avoided, would include cooking, ironing clothes, financial bill payments. Having structure to a day is important for the patient. Cognitive and physical activities should be considered daily. Discussed the data in regards to [facility] diet and brain health      2. Prior stroke:     Needs aspirin daily. Discussed on working closely with his primary care doctor. He will need to keep his systolic blood pressure under 140. I provided stroke education today in regards to risk factors for stroke and lifestyle modifications to help minimize the risk of future stroke. This included medication compliance, regular follow up with primary care physician,  and healthy lifestyle habits (nutrition/exercise)     3. Nocturia:  He will follow-up with his urologist as this can have a significant impact on his cognitive function     4. Stress and anxiety:  He will continue with the medication previously prescribed to have close follow-up with his primary care doctor         The patient should return to clinic in 3-4 months    Renewed medication: yes. Side effects and toxicity reviewed. I spent  25   minutes with the patient  with over 50 % of the time counseling and coordinating the care plan in regards to the diagnosis, diagnostic testing, and treatment plan. The patient had the ability to ask questions and all questions were answered. Signed By:  Tracee Romo DO FAAN    June 16, 2020

## 2020-06-16 ENCOUNTER — OFFICE VISIT (OUTPATIENT)
Dept: NEUROLOGY | Age: 75
End: 2020-06-16

## 2020-06-16 VITALS
SYSTOLIC BLOOD PRESSURE: 122 MMHG | HEART RATE: 62 BPM | WEIGHT: 190 LBS | HEIGHT: 75 IN | OXYGEN SATURATION: 95 % | DIASTOLIC BLOOD PRESSURE: 80 MMHG | BODY MASS INDEX: 23.62 KG/M2

## 2020-06-16 DIAGNOSIS — F01.50 VASCULAR DEMENTIA WITHOUT BEHAVIORAL DISTURBANCE (HCC): Primary | ICD-10-CM

## 2020-06-16 RX ORDER — DONEPEZIL HYDROCHLORIDE 10 MG/1
10 TABLET, FILM COATED ORAL
Qty: 90 TAB | Refills: 3 | Status: SHIPPED | OUTPATIENT
Start: 2020-06-16 | End: 2020-09-22 | Stop reason: SDUPTHER

## 2020-06-16 RX ORDER — BISMUTH SUBSALICYLATE 262 MG
1 TABLET,CHEWABLE ORAL DAILY
COMMUNITY

## 2020-09-22 RX ORDER — DONEPEZIL HYDROCHLORIDE 10 MG/1
10 TABLET, FILM COATED ORAL
Qty: 90 TAB | Refills: 3 | Status: SHIPPED | OUTPATIENT
Start: 2020-09-22 | End: 2020-10-05 | Stop reason: SDUPTHER

## 2020-09-22 NOTE — TELEPHONE ENCOUNTER
Future Appointments   Date Time Provider Irene Mercedes   10/15/2020  1:40 PM Сергей Dunn, DO [facility]                         Last Appointment My Department:  Visit date not found    Please advise of refill below. Requested Prescriptions     Pending Prescriptions Disp Refills    donepeziL (Aricept) 10 mg tablet 90 Tab 3     Sig: Take 1 Tab by mouth nightly.

## 2020-09-22 NOTE — TELEPHONE ENCOUNTER
----- Message from Mary Alice Butler sent at 9/22/2020  1:42 PM EDT -----  Regarding: Dr. Abiodun Walker (if not patient): Self  Relationship of caller (if not patient): Self  Best contact number(s): [phone]  Name of medication and dosage if known: \"Aricept\", 10 mg  Is patient out of this medication (yes/no): Y  Pharmacy name: [facility] [address] listed in chart? (yes/no): N  Pharmacy phone number: [phone]  Date of last visit: 06/16/2020  Details to clarify the request: Patient need refill of the above medication to the new pharmacy of [facility], see above.

## 2020-09-29 ENCOUNTER — TELEPHONE (OUTPATIENT)
Dept: NEUROLOGY | Age: 75
End: 2020-09-29

## 2020-10-05 RX ORDER — DONEPEZIL HYDROCHLORIDE 10 MG/1
10 TABLET, FILM COATED ORAL
Qty: 90 TAB | Refills: 3 | Status: SHIPPED | OUTPATIENT
Start: 2020-10-05

## 2020-10-14 NOTE — PROGRESS NOTES
Neurology Note    Patient ID:  Radha Ford  [id number]  70 y.o.  1945      Date of Consultation:  October 15, 2020    Referring Physician: Dr. Madi Hernandez    Reason for Consultation:  Memory concerns    Subjective: my memory       History of Present Illness:   Radha Granger is a 70 y.o. male who returns to the neurology clinic at [facility] for evaluation. He was last seen in clinic on June 16, 2020. please see my history of present illness, examination, and treatment based plan from that day. He was being seen for progressive memory loss and a prior stroke. Since that time, he feels everything was about the same from a memory standpoint. He felt speech therapy went well. He is doing cognitive therapy every day now. He recognizes his short term memory deficits now and is trying strategies to combat this. Since he was last here, He had lumbar stenosis surgery - On October 6th. Sutures scheduled to be removed next week. He was having sig. Pain and numbness down his legs. This has improved. Surgery was a success. Dr. Meeta Colin did the surgery. Past Medical History:   Diagnosis Date    Anxiety     Cerebral artery occlusion with cerebral infarction ([facility] [address]     Memory disorder         Past Surgical History:   Procedure Laterality Date    HX CERVICAL FUSION          No family history on file. Social History     Tobacco Use    Smoking status: Never Smoker    Smokeless tobacco: Never Used   Substance Use Topics    Alcohol use: Yes     Alcohol/week: 1.0 standard drinks     Types: 1 Glasses of wine per week        No Known Allergies     Prior to Admission medications    Medication Sig Start Date End Date Taking? Authorizing Provider   donepeziL (Aricept) 10 mg tablet Take 1 Tab by mouth nightly. 10/5/20   Сергей Dunn,    multivitamin (ONE A DAY) tablet Take 1 Tab by mouth daily. Provider, Historical   Desvenlafaxine (PRISTIQ) 100 mg Tb24 Take  by mouth daily. Provider, Historical       Review of Systems:    General, constitutional: negative  Eyes, vision: negative  Ears, nose, throat: negative  Cardiovascular, heart: negative  Respiratory: negative  Gastrointestinal: negative  Genitourinary: negative  Musculoskeletal: negative  Skin and integumentary: negative  Psychiatric: negative  Endocrine: negative  Neurological: negative, except for HPI  Hematologic/lymphatic: negative  Allergy/immunology: negative      Objective:     Visit Vitals  BP (!) 100/55   Pulse (!) 44   Temp 97.9 °F (36.6 °C)   Ht 6' 3\" (1.905 m)   Wt 190 lb (86.2 kg)   SpO2 97%   BMI 23.75 kg/m²       Physical Exam:      General:  appears well nourished in no acute distress  Neck: no carotid bruits  Lungs: clear to auscultation  Heart:  no murmurs, regular rate  Lower extremity: peripheral pulses palpable and no edema  Skin: intact    Neurological exam:    Awake, alert, oriented to person, place and time. At times, slow to respond. Mild difficulty with immediate recall. Recent and remote memory were normal  Attention and concentration were intact  Language was intact. There was no aphasia  Speech: no dysarthria  Fund of knowledge was preserved  He could name 17 words that begin with the letter p in one minute. Cranial nerves:   II-XII were tested    Perrrla  Visual fields were full  Eomi, no evidence of nystagmus  Facial sensation:  normal and symmetric  Facial motor: normal and symmetric  Hearing intact  SCM strength intact  Tongue: midline without fasciculations    Motor: Tone normal    No evidence of fasciculations    Strength testing:   deltoid triceps biceps Wrist ext. Wrist flex. intrinsics Hip flex. Hip ext. Knee ext.   Knee flex Dorsi flex Plantar flex   Right 5 5 5 5 5 5 5 5 5 5 5 5   Left 5 5 5 5 5 5 5 5 5 5 5 5         Sensory:  Upper extremity: intact to pp, light touch, and vibration > 10 seconds  Lower extremity: intact to pp, light touch, and vibration > 10 seconds    Reflexes:    Right Left  Biceps  2 2  Triceps 2 2  Brachiorad. 2 2  Patella  2 2  Achilles 2 2    Plantar response:  flexor bilaterally      Cerebellar testing:  no tremor apparent, finger/nose and di were intact    Romberg: absent    Gait: steady. Labs:     No results found for: HBA1C, NA, K, CL, GLU, BUN, CREA, CA, WBC, HCT, HGB, PLT, LDL, OSP1COTX, HCTEXT, HGBEXT, PLTEXT, [id number], HCTEXT, HGBEXT, PLTEXT    Imaging:    Results from Hospital Encounter encounter on 03/09/20   MRI BRAIN W WO CONT    Narrative EXAM:  MRI BRAIN W WO CONT    INDICATION:    cte vs dementia    COMPARISON:  None. CONTRAST: 17 ml Dotarem. TECHNIQUE:    Multiplanar multisequence acquisition without and with contrast of the brain. FINDINGS:  Mild generalized parenchymal volume loss with commensurate dilation of the sulci  and ventricular system. Small chronic infarcts are noted in the right corona  radiata/basal ganglia, right medial thalamus, and left cerebellum. No chronic  microhemorrhages are identified. There is no acute infarct, hemorrhage,  extra-axial fluid collection, or mass effect. There is no cerebellar tonsillar  herniation. Expected arterial flow-voids are present. No evidence of abnormal  enhancement. Retention cyst in the left maxillary sinus, and scattered mucosal thickening in  the bilateral ethmoidal air cells without air-fluid level. The mastoid air cells  and middle ears are clear. The orbital contents are within normal limits. No  significant osseous or scalp lesions are identified. Impression IMPRESSION:     1. No acute intracranial abnormality. 2. Mild generalized parenchymal volume loss. Small chronic infarcts in the right  corona radiata/basal ganglia, right thalamus, and left cerebellum. No results found for this or any previous visit.           Assessment and Plan:  Patient is a pleasant 82-year-old gentleman who presents with concerns of memory loss.    His recent brain MRI showed mild atrophy and 3 old strokes. His neuropsychological testing revealed cognitive loss which is most likely multifactorial.     1. Cognitive impairment:     This is most likely multifactorial in etiology. I did review with him the results of his MRI of the brain as well as the neuropsychological testing. I am concerned about the beginnings of a dementia as discussed at last visit. Most likely vascular dementia, however an alzheimer's type may also be present. There is no specific evidence suggestive of CTE. Continue asa daily. Continue speech and language therapy. Continue aricept 10 mg a day. Continue cog. Stimulating activities.       I discussed with the patient and family members in regards to the patient's cognitive limitations and need to ensure patient safety. Attempts to minimize opportunities of harm is important. Activities to be monitored, or avoided, would include cooking, ironing clothes, financial bill payments. Having structure to a day is important for the patient. Cognitive and physical activities should be considered daily. Discussed the data in regards to [employer] [facility] diet and brain health      2. Prior stroke:   risk factors:  Htn, age. aspirin daily. Hypertension: Continue aggressive control with goal systolic blood pressure under 140    I provided stroke education today in regards to risk factors for stroke and lifestyle modifications to help minimize the risk of future stroke. This included medication compliance, regular follow up with primary care physician,  and healthy lifestyle habits (nutrition/exercise)     3. Nocturia:  Followed closely with urology     4. Stress and anxiety:  He will continue with the medication previously prescribed to have close follow-up with his primary care doctor    5. Lumbar spine disease: Followed by neurosurgery    6.   Coronavirus pandemic:  I did discuss with the patient at length the importance of social distancing and proper hygiene especially during these times. The patient is at a higher risk of having a more severe course of the disease and needs to follow all CDC recommendations. The patient acknowledges. The patient should return to clinic in 4 months    Renewed medication: yes. Side effects and toxicity reviewed. .                  Signed By:  Kristy Ellis DO FAAN    October 15, 2020

## 2020-10-15 ENCOUNTER — OFFICE VISIT (OUTPATIENT)
Dept: NEUROLOGY | Age: 75
End: 2020-10-15
Payer: MEDICARE

## 2020-10-15 VITALS
HEIGHT: 75 IN | TEMPERATURE: 97.9 F | HEART RATE: 44 BPM | SYSTOLIC BLOOD PRESSURE: 100 MMHG | DIASTOLIC BLOOD PRESSURE: 55 MMHG | OXYGEN SATURATION: 97 % | WEIGHT: 190 LBS | BODY MASS INDEX: 23.62 KG/M2

## 2020-10-15 DIAGNOSIS — G31.84 MILD COGNITIVE IMPAIRMENT WITH MEMORY LOSS: ICD-10-CM

## 2020-10-15 DIAGNOSIS — F01.50 VASCULAR DEMENTIA WITHOUT BEHAVIORAL DISTURBANCE (HCC): Primary | ICD-10-CM

## 2020-10-15 PROCEDURE — 99214 OFFICE O/P EST MOD 30 MIN: CPT | Performed by: PSYCHIATRY & NEUROLOGY

## 2021-02-24 NOTE — PROGRESS NOTES
Neurology Note    Patient ID:  Corina Andino  [id number]  70 y.o.  1945      Date of Consultation:  February 25, 2021        Subjective: my memory is ok, but I have a tremor       History of Present Illness:   Corina Conner is a 70 y.o. male who returns to the neurology clinic at [facility] for evaluation. He was last seen in clinic on October 15, 2020 please see my history of present illness, examination, and treatment based plan from that day. He was being seen for progressive memory loss and a prior stroke. Since that time, he feels everything was about the same from a memory standpoint. He does still perform cognitive activities daily. He has really worked on his diet and by changing his diet and minimizing his sugar intake, he does feel that his memory is doing better. He has recovered nicely from his prior lumbar spine surgery. He did have a cardiac ablation in November 2020. He reports that he has made good recovery from that. This was done at [facility].  He did get placed on Eliquis after the procedure. He is concerned about the development of a very fine tremor in his left hand and arm. He says it would not bother him but he can feel this fine tremor and it does not impact him if he is trying to do any dental work. He also notices it when he brushes his teeth. It is worse with activity. He had researched this online and was asking about doing focused ultrasound surgery. He has officially retired as of December 31st., but is thinking about part time work. Past Medical History:   Diagnosis Date    Anxiety     Cerebral artery occlusion with cerebral infarction ([facility] [address]     Memory disorder         Past Surgical History:   Procedure Laterality Date    HX CERVICAL FUSION          No family history on file. No history of neurological  Disease at a young age.     Social History     Tobacco Use    Smoking status: Never Smoker    Smokeless tobacco: Never Used   Substance Use Topics    Alcohol use: Yes     Alcohol/week: 1.0 standard drinks     Types: 1 Glasses of wine per week        No Known Allergies     Prior to Admission medications    Medication Sig Start Date End Date Taking? Authorizing Provider   lisinopriL (PRINIVIL, ZESTRIL) 10 mg tablet Take  by mouth daily. Yes Provider, Historical   metoprolol succinate (TOPROL-XL) 100 mg tablet Take  by mouth daily. Yes Provider, Historical   primidone (MYSOLINE) 50 mg tablet Take 1 Tab by mouth two (2) times a day. 2/25/21  Yes Сергей Dunn, DO   donepeziL (Aricept) 10 mg tablet Take 1 Tab by mouth nightly. 10/5/20  Yes Сергей Dunn, DO   multivitamin (ONE A DAY) tablet Take 1 Tab by mouth daily. Yes Provider, Historical   Desvenlafaxine (PRISTIQ) 100 mg Tb24 Take  by mouth daily. Provider, Historical   he was started on eliquis. Review of Systems:    General, constitutional: negative  Eyes, vision: negative  Ears, nose, throat: negative  Cardiovascular, heart: negative  Respiratory: negative  Gastrointestinal: negative  Genitourinary: negative  Musculoskeletal: negative  Skin and integumentary: negative  Psychiatric: negative  Endocrine: negative  Neurological: negative, except for HPI  Hematologic/lymphatic: negative  Allergy/immunology: negative      Objective:     Visit Vitals  /78 (BP 1 Location: Right arm, BP Patient Position: Sitting, BP Cuff Size: Adult)   Pulse 63   Resp 16   Wt 197 lb (89.4 kg)   SpO2 97%   BMI 24.62 kg/m²       Physical Exam:    General:  appears well nourished in no acute distress  Neck: no carotid bruits  Lungs: clear to auscultation  Heart:  no murmurs, regular rate  Lower extremity: peripheral pulses palpable and no edema  Skin: intact    Neurological exam:    Awake, alert, oriented to person, place and time. At times, slow to respond, but better than previously.   Better recall than the past  Recent and remote memory were normal  Attention and concentration were intact  Language was intact. There was no aphasia  Speech: no dysarthria  Fund of knowledge was preserved  He could name 16 words that begin with the letter L in one minute. Does well with calculations, similarities. Cranial nerves:   II-XII were tested    Perrrla  Visual fields were full  Eomi, no evidence of nystagmus  Facial sensation:  normal and symmetric  Facial motor: normal and symmetric  Hearing intact  SCM strength intact  Tongue: midline without fasciculations    Motor: Tone normal    No evidence of fasciculations    Strength testing:   deltoid triceps biceps Wrist ext. Wrist flex. intrinsics Hip flex. Hip ext. Knee ext. Knee flex Dorsi flex Plantar flex   Right 5 5 5 5 5 5 5 5 5 5 5 5   Left 5 5 5 5 5 5 5 5 5 5 5 5         Sensory:  Upper extremity: intact to pp, light touch, and vibration > 10 seconds  Lower extremity: intact to pp, light touch, and vibration > 10 seconds    Reflexes:    Right Left  Biceps  2 2  Triceps 2 2  Brachiorad. 2 2  Patella  2 2  Achilles 2 2    Plantar response:  flexor bilaterally      Cerebellar testing:  no resting tremor apparent, finger/nose and di were intact. There was a very mild action based tremor, left > right. Quite mild, but present. No cogwheel rigidity    Romberg: absent    Gait: steady. Labs:     No results found for: HBA1C, NA, K, CL, GLU, BUN, CREA, CA, WBC, HCT, HGB, PLT, LDL, NVV4OESY, HCTEXT, HGBEXT, PLTEXT, CEO3YPLP, HCTEXT, HGBEXT, PLTEXT    Imaging:    Results from Hospital Encounter encounter on 03/09/20   MRI BRAIN W WO CONT    Narrative EXAM:  MRI BRAIN W WO CONT    INDICATION:    cte vs dementia    COMPARISON:  None. CONTRAST: 17 ml Dotarem. TECHNIQUE:    Multiplanar multisequence acquisition without and with contrast of the brain. FINDINGS:  Mild generalized parenchymal volume loss with commensurate dilation of the sulci  and ventricular system.  Small chronic infarcts are noted in the right corona  radiata/basal ganglia, right medial thalamus, and left cerebellum. No chronic  microhemorrhages are identified. There is no acute infarct, hemorrhage,  extra-axial fluid collection, or mass effect. There is no cerebellar tonsillar  herniation. Expected arterial flow-voids are present. No evidence of abnormal  enhancement. Retention cyst in the left maxillary sinus, and scattered mucosal thickening in  the bilateral ethmoidal air cells without air-fluid level. The mastoid air cells  and middle ears are clear. The orbital contents are within normal limits. No  significant osseous or scalp lesions are identified. Impression IMPRESSION:     1. No acute intracranial abnormality. 2. Mild generalized parenchymal volume loss. Small chronic infarcts in the right  corona radiata/basal ganglia, right thalamus, and left cerebellum. No results found for this or any previous visit. Assessment and Plan:    Patient is a pleasant 74-year-old gentleman who initially presented with  with concerns of memory loss.    His recent brain MRI showed mild atrophy and 3 old strokes. His neuropsychological testing revealed cognitive loss which is most likely multifactorial.     1. Cognitive impairment:     This is most likely multifactorial in etiology. I did review with him the results of his MRI of the brain as well as the neuropsychological testing. I am concerned about the beginnings of a dementia as discussed at last visit. Most likely vascular dementia, however an alzheimer's type may also be present. There is no specific evidence suggestive of CTE. His neurological examination has not changed since prior visit and hand has been stable if not slightly improved. He was on aspirin but this has been switched to Eliquis due to cardiac reasons after his ablation. Continue aricept 10 mg a day. Continue cog. Stimulating activities. Having structure to a day is important for the patient. Cognitive and physical activities should be considered daily. Discussed the data in regards to [address] [facility] diet and brain health      2. Prior stroke:   risk factors:  Htn, age. He currently is on Eliquis due to to cardiac reasons. Hypertension: Continue aggressive control with goal systolic blood pressure under 140    I provided stroke education today in regards to risk factors for stroke and lifestyle modifications to help minimize the risk of future stroke. This included medication compliance, regular follow up with primary care physician,  and healthy lifestyle habits (nutrition/exercise)     3. Mild essential tremor: This is very mild. I did discuss with him options including treatment versus watching given how mild it is. He is very concerned about this and how it impacts his day-to-day life with certain functioning. I will start him on primidone 50 mg twice a day. Side effects were discussed. He is already on metoprolol so another beta-blocker such as propranolol would not be recommended.       4.  Lumbar spine disease: Followed by neurosurgery    5. Coronavirus pandemic:  I did discuss with the patient at length the importance of social distancing and proper hygiene especially during these times. The patient is at a higher risk of having a more severe course of the disease and needs to follow all CDC recommendations. The patient acknowledges. He has received his first dose of the Covid vaccine           The patient should return to clinic in 6 months    Renewed medication: yes. Side effects discussed    I spent   30  minutes on the day of the encounter preparing the office visit by reviewing medical records, obtaining a history, performing examination, counseling and educating the patient and family members on diagnosis, ordering medications and tests, documenting in the clinical medical record, and coordinating the care for the patient.   The patient had the ability to ask questions and all questions were answered.                    Signed By:  Ave Krishna DO FAAN    February 25, 2021

## 2021-02-25 ENCOUNTER — OFFICE VISIT (OUTPATIENT)
Dept: NEUROLOGY | Age: 76
End: 2021-02-25
Payer: MEDICARE

## 2021-02-25 VITALS
BODY MASS INDEX: 24.62 KG/M2 | SYSTOLIC BLOOD PRESSURE: 116 MMHG | HEART RATE: 63 BPM | WEIGHT: 197 LBS | RESPIRATION RATE: 16 BRPM | DIASTOLIC BLOOD PRESSURE: 78 MMHG | OXYGEN SATURATION: 97 %

## 2021-02-25 DIAGNOSIS — G25.0 ESSENTIAL TREMOR: ICD-10-CM

## 2021-02-25 DIAGNOSIS — G31.84 MILD COGNITIVE IMPAIRMENT WITH MEMORY LOSS: ICD-10-CM

## 2021-02-25 DIAGNOSIS — F01.50 VASCULAR DEMENTIA WITHOUT BEHAVIORAL DISTURBANCE (HCC): Primary | ICD-10-CM

## 2021-02-25 PROCEDURE — 99214 OFFICE O/P EST MOD 30 MIN: CPT | Performed by: PSYCHIATRY & NEUROLOGY

## 2021-02-25 PROCEDURE — G8536 NO DOC ELDER MAL SCRN: HCPCS | Performed by: PSYCHIATRY & NEUROLOGY

## 2021-02-25 PROCEDURE — 1101F PT FALLS ASSESS-DOCD LE1/YR: CPT | Performed by: PSYCHIATRY & NEUROLOGY

## 2021-02-25 PROCEDURE — G8420 CALC BMI NORM PARAMETERS: HCPCS | Performed by: PSYCHIATRY & NEUROLOGY

## 2021-02-25 PROCEDURE — 3017F COLORECTAL CA SCREEN DOC REV: CPT | Performed by: PSYCHIATRY & NEUROLOGY

## 2021-02-25 PROCEDURE — G8427 DOCREV CUR MEDS BY ELIG CLIN: HCPCS | Performed by: PSYCHIATRY & NEUROLOGY

## 2021-02-25 PROCEDURE — G8432 DEP SCR NOT DOC, RNG: HCPCS | Performed by: PSYCHIATRY & NEUROLOGY

## 2021-02-25 RX ORDER — LISINOPRIL 10 MG/1
TABLET ORAL DAILY
COMMUNITY

## 2021-02-25 RX ORDER — PRIMIDONE 50 MG/1
50 TABLET ORAL 2 TIMES DAILY
Qty: 60 TAB | Refills: 5 | Status: SHIPPED | OUTPATIENT
Start: 2021-02-25

## 2021-02-25 RX ORDER — METOPROLOL SUCCINATE 100 MG/1
TABLET, EXTENDED RELEASE ORAL DAILY
COMMUNITY

## 2021-02-25 NOTE — LETTER
2/25/2021 Patient: Fang Rincon. YOB: 1945 Date of Visit: 2/25/2021 Markos Barahona MD 
[address] [address] Via Fax: [phone] Dear Markos Barahona MD, Thank you for referring Mr. Javier Loyola to [address] [place] [address] for evaluation. My notes for this consultation are attached. If you have questions, please do not hesitate to call me. I look forward to following your patient along with you. Sincerely, Сергей Dunn, DO

## 2021-03-02 ENCOUNTER — TELEPHONE (OUTPATIENT)
Dept: NEUROLOGY | Age: 76
End: 2021-03-02

## 2021-03-02 NOTE — TELEPHONE ENCOUNTER
----- Message from Valery Guzman sent at 3/2/2021  2:47 PM EST -----  Regarding: Dr. Kaiser He first and last name: [facility]       Reason for call: Need last office note in order to proceed with appt for Tremors  ASAP      Callback required yes/no and why: yes     Best contact number(s):[address] Navigator/ fax number is [phone]        Details to clarify the request: tried to contact office no response      Jennifer Hargrove

## 2021-07-08 ENCOUNTER — TELEPHONE (OUTPATIENT)
Dept: NEUROLOGY | Age: 76
End: 2021-07-08

## 2021-07-08 NOTE — TELEPHONE ENCOUNTER
Patient's wife called in and there has been a change in the patient's behavior over the past 3-4 months    Very angry and agitated and paranoid. Not constant but happening more often. Not taking his medications including Pristiq for several days at a time. Patient's son and noticed this change and that sparked this call. She wants to keep the appointment 8/30/2021 with Dr. Trudy Wolf, but wants to be present. She doesn't want anything for change, but just wanted to make Dr. Trudy Wolf aware so that he might prompt asking questions about his health. I told her that I will send this to Dr. Trudy Wolf.   She doesn't want the doctor to mention that she called or mentioned this at the time of the appointment

## 2023-05-26 RX ORDER — METOPROLOL SUCCINATE 100 MG/1
TABLET, EXTENDED RELEASE ORAL DAILY
COMMUNITY

## 2023-05-26 RX ORDER — DONEPEZIL HYDROCHLORIDE 10 MG/1
10 TABLET, FILM COATED ORAL
COMMUNITY
Start: 2020-10-05

## 2023-05-26 RX ORDER — LISINOPRIL 10 MG/1
TABLET ORAL DAILY
COMMUNITY

## 2023-05-26 RX ORDER — DESVENLAFAXINE 100 MG/1
TABLET, EXTENDED RELEASE ORAL DAILY
COMMUNITY

## 2023-05-26 RX ORDER — PRIMIDONE 50 MG/1
50 TABLET ORAL 2 TIMES DAILY
COMMUNITY
Start: 2021-02-25